# Patient Record
Sex: FEMALE | Race: WHITE | Employment: FULL TIME | URBAN - METROPOLITAN AREA
[De-identification: names, ages, dates, MRNs, and addresses within clinical notes are randomized per-mention and may not be internally consistent; named-entity substitution may affect disease eponyms.]

---

## 2017-06-01 ENCOUNTER — HOSPITAL ENCOUNTER (EMERGENCY)
Age: 67
Discharge: HOME | End: 2017-06-01
Attending: EMERGENCY MEDICINE
Payer: COMMERCIAL

## 2017-06-01 VITALS
TEMPERATURE: 99 F | SYSTOLIC BLOOD PRESSURE: 159 MMHG | RESPIRATION RATE: 18 BRPM | WEIGHT: 173 LBS | HEART RATE: 76 BPM | DIASTOLIC BLOOD PRESSURE: 69 MMHG | OXYGEN SATURATION: 97 % | BODY MASS INDEX: 28.82 KG/M2 | HEIGHT: 65 IN

## 2017-06-01 DIAGNOSIS — L02.91 ABSCESS: ICD-10-CM

## 2017-06-01 DIAGNOSIS — L03.211: Primary | ICD-10-CM

## 2017-06-01 PROCEDURE — 99284 EMERGENCY DEPT VISIT MOD MDM: CPT

## 2017-06-01 PROCEDURE — 87070 CULTURE OTHR SPECIMN AEROBIC: CPT | Performed by: EMERGENCY MEDICINE

## 2017-06-01 PROCEDURE — 10061 I&D ABSCESS COMP/MULTIPLE: CPT

## 2017-06-01 PROCEDURE — 85025 COMPLETE CBC W/AUTO DIFF WBC: CPT | Performed by: PHYSICIAN ASSISTANT

## 2017-06-01 PROCEDURE — 96365 THER/PROPH/DIAG IV INF INIT: CPT

## 2017-06-01 PROCEDURE — 87147 CULTURE TYPE IMMUNOLOGIC: CPT | Performed by: EMERGENCY MEDICINE

## 2017-06-01 PROCEDURE — 87205 SMEAR GRAM STAIN: CPT | Performed by: EMERGENCY MEDICINE

## 2017-06-01 PROCEDURE — 87186 SC STD MICRODIL/AGAR DIL: CPT | Performed by: EMERGENCY MEDICINE

## 2017-06-01 PROCEDURE — 80053 COMPREHEN METABOLIC PANEL: CPT | Performed by: PHYSICIAN ASSISTANT

## 2017-06-01 RX ORDER — VARENICLINE TARTRATE 0.5 MG/1
0.5 TABLET, FILM COATED ORAL 2 TIMES DAILY
COMMUNITY
End: 2020-09-10 | Stop reason: ALTCHOICE

## 2017-06-01 RX ORDER — HYDROCODONE BITARTRATE AND ACETAMINOPHEN 5; 325 MG/1; MG/1
1 TABLET ORAL EVERY 6 HOURS PRN
Qty: 5 TABLET | Refills: 0 | Status: SHIPPED | OUTPATIENT
Start: 2017-06-01 | End: 2017-06-08

## 2017-06-01 RX ORDER — CLINDAMYCIN PHOSPHATE 900 MG/50ML
900 INJECTION INTRAVENOUS ONCE
Status: COMPLETED | OUTPATIENT
Start: 2017-06-01 | End: 2017-06-01

## 2017-06-01 RX ORDER — CLINDAMYCIN HYDROCHLORIDE 300 MG/1
300 CAPSULE ORAL 3 TIMES DAILY
Qty: 30 CAPSULE | Refills: 0 | Status: SHIPPED | OUTPATIENT
Start: 2017-06-01 | End: 2017-06-11

## 2017-06-01 NOTE — ED PROVIDER NOTES
I reviewed that chart and discussed the case. I have examined the patient and noted nodule at the right temple. There was a very tiny amount of purulent material expressed with pressure.     Procedure:  Area cleansed with Betadine and locally anesthetized

## 2017-06-01 NOTE — ED PROVIDER NOTES
Patient Seen in: [facility] [place]    History   Patient presents with:  Cellulitis (integumentary, infectious): rt side of face    Stated Complaint:     HPI    77year old female presents to emergency department from immediate car Constitutional and vital signs reviewed. All other systems reviewed and negative except as noted above. PSFH elements reviewed from today and agreed except as otherwise stated in HPI.     Physical Exam       ED Triage Vitals   BP 06/01/17 1739 187/9 Please view results for these tests on the individual orders. AEROBIC BACTERIAL CULTURE       MDM     Patient with normal white count. Given 900 mg clindamycin IV in emergency department.   She will be discharged on clindamycin and advised to discontinue

## 2017-06-01 NOTE — ED INITIAL ASSESSMENT (HPI)
Pt states she noticed what she thought was either a pimple or a bite on Sunday [facility], worsening redness today and pt feeling fatigued.

## 2018-06-21 PROBLEM — M79.641 RIGHT HAND PAIN: Status: ACTIVE | Noted: 2018-06-21

## 2018-06-21 PROBLEM — M79.89 SWELLING OF RIGHT HAND: Status: ACTIVE | Noted: 2018-06-21

## (undated) NOTE — ED AVS SNAPSHOT
[facility] Emergency Department in [address]    Phone:  [phone]    Fax:  [phone]           Ms. Len Garcia   MRN: [id number]    Department:  [facility] Emergency Department in [facility] - Clindamycin HCl 300 MG Caps  - HYDROcodone-acetaminophen 5-325 MG Tabs              Discharge Instructions       Discontinue the Keflex. Apply warm packs to the painful area.     Discharge References/Attachments     ABSCESS, INCISION AND DRAINAGE (ENG of that Physician. IF THERE IS ANY CHANGE OR WORSENING OF YOUR CONDITION, CALL YOUR PRIMARY CARE PHYSICIAN AT ONCE OR RETURN IMMEDIATELY TO THE EMERGENCY DEPARTMENT.     If you have been prescribed any medication(s), please fill your prescription right a - If you don’t have insurance, [facility] Col[facility] has partnered with [employer] [facility] to help you get signed up for insurance coverage.   [employer] is a [employer] program that can help with your Affordable Care Act cover

## (undated) NOTE — ED AVS SNAPSHOT
[facility] Emergency Department in [address] [facility] [address] [facility]    Phone:  [phone]    Fax:  [phone]           Ms. Stark Maude   MRN: [id number]    Department:  [facility] Emergency Department in [facility] IF THERE IS ANY CHANGE OR WORSENING OF YOUR CONDITION, CALL YOUR PRIMARY CARE PHYSICIAN AT ONCE OR RETURN IMMEDIATELY TO THE EMERGENCY DEPARTMENT.     If you have been prescribed any medication(s), please fill your prescription right away and begin taking